# Patient Record
Sex: MALE | Race: WHITE
[De-identification: names, ages, dates, MRNs, and addresses within clinical notes are randomized per-mention and may not be internally consistent; named-entity substitution may affect disease eponyms.]

---

## 2020-03-12 ENCOUNTER — HOSPITAL ENCOUNTER (EMERGENCY)
Dept: HOSPITAL 96 - M.ERS | Age: 70
Discharge: HOME | End: 2020-03-12
Payer: COMMERCIAL

## 2020-03-12 VITALS — HEIGHT: 67 IN | BODY MASS INDEX: 26.4 KG/M2 | WEIGHT: 168.21 LBS

## 2020-03-12 VITALS — DIASTOLIC BLOOD PRESSURE: 77 MMHG | SYSTOLIC BLOOD PRESSURE: 132 MMHG
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
## 2020-03-12 DIAGNOSIS — S30.1XXA: ICD-10-CM
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
## 2020-03-12 DIAGNOSIS — F10.129: ICD-10-CM
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
## 2020-03-12 DIAGNOSIS — S01.81XA: ICD-10-CM

## 2020-03-12 DIAGNOSIS — S42.102A: Primary | ICD-10-CM
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
## 2020-03-12 LAB
ABSOLUTE BASOPHILS: 0.1 THOU/UL (ref 0–0.2)
ABSOLUTE EOSINOPHILS: 0.1 THOU/UL (ref 0–0.7)
ABSOLUTE MONOCYTES: 1.1 THOU/UL (ref 0–1.2)
ALBUMIN SERPL-MCNC: 3.3 G/DL (ref 3.4–5)
ALP SERPL-CCNC: 51 U/L (ref 46–116)
ALT SERPL-CCNC: 57 U/L (ref 30–65)
ANION GAP SERPL CALC-SCNC: 13 MMOL/L (ref 7–16)
APTT BLD: 24.2 SECONDS (ref 25–31.3)
AST SERPL-CCNC: 68 U/L (ref 15–37)
BASOPHILS NFR BLD AUTO: 0.4 %
BILIRUB SERPL-MCNC: 0.4 MG/DL
BUN SERPL-MCNC: 11 MG/DL (ref 7–18)
CALCIUM SERPL-MCNC: 7.3 MG/DL (ref 8.5–10.1)
CHLORIDE SERPL-SCNC: 108 MMOL/L (ref 98–107)
CO2 SERPL-SCNC: 24 MMOL/L (ref 21–32)
CREAT SERPL-MCNC: 0.8 MG/DL (ref 0.6–1.3)
EOSINOPHIL NFR BLD: 0.4 %
GLUCOSE SERPL-MCNC: 161 MG/DL (ref 70–99)
GRANULOCYTES NFR BLD MANUAL: 70.2 %
HCT VFR BLD CALC: 41.6 % (ref 42–52)
HGB BLD-MCNC: 14.1 GM/DL (ref 14–18)
INR PPP: 1.2
LYMPHOCYTES # BLD: 3 THOU/UL (ref 0.8–5.3)
LYMPHOCYTES NFR BLD AUTO: 21.3 %
MCH RBC QN AUTO: 34 PG (ref 26–34)
MCHC RBC AUTO-ENTMCNC: 34 G/DL (ref 28–37)
MCV RBC: 100 FL (ref 80–100)
MONOCYTES NFR BLD: 7.7 %
MPV: 8.3 FL. (ref 7.2–11.1)
NEUTROPHILS # BLD: 9.9 THOU/UL (ref 1.6–8.1)
NUCLEATED RBCS: 0 /100WBC
PLATELET COUNT*: 201 THOU/UL (ref 150–400)
POTASSIUM SERPL-SCNC: 3.9 MMOL/L (ref 3.5–5.1)
PROT SERPL-MCNC: 6.3 G/DL (ref 6.4–8.2)
PROTHROMBIN TIME: 12 SECONDS (ref 9.2–11.5)
RBC # BLD AUTO: 4.16 MIL/UL (ref 4.5–6)
RDW-CV: 14 % (ref 10.5–14.5)
SODIUM SERPL-SCNC: 145 MMOL/L (ref 136–145)
WBC # BLD AUTO: 14.1 THOU/UL (ref 4–11)

## 2020-03-12 NOTE — EKG
[place] [address]
Phone:  [phone]                     ELECTROCARDIOGRAM REPORT      
_______________________________________________________________________________
 
Name:         ALPHONSOLOREN              Room:                     [facility].#:    [id number]     Account #:     [id number]  
Admission:    20    Attend Phys:                     
Discharge:    20    Date of Birth: [birth year]  
Date of Service: 20 1321  Report #:      [id number]
        [id number]
_______________________________________________________________________________
THIS REPORT FOR:  //name//                      
 
                         [facility] ED
                                       
Test Date:    2020               Test Time:    13:21:37
Pat Name:     LOREN WERNER         Department:   
Patient ID:   [id number]            Room:          
Gender:                               Technician:   
:          [id number]               Requested By: Joaquin Kaye
Order Number: [id number] MD:   Raul Albarran
                                 Measurements
Intervals                              Axis          
Rate:         109                      P:            
AL:                                    QRS:          43
QRSD:         158                      T:            244
QT:           343                                    
QTc:          463                                    
                           Interpretive Statements
Atrial fibrillation
Premature ventricular contractions
Right bundle branch block
ST depr, consider ischemia, anterolateral lds
Compared to ECG 2017 13:10:45
Possible ischemia now present
Sinus rhythm no longer present
Ventricular-paced complex(es) or rhythm no longer present
Myocardial infarct finding no longer present
 
Electronically Signed On 3- 18:46:38 CDT by Raul Albarran
[email or web address]
 
 
 
 
 
 
 
 
 
 
 
 
 
 
  <ELECTRONICALLY SIGNED>
                                           By: Raul Albarran MD, FACC   
  20     1846
D: 20 1321   _____________________________________
T: 20 1321   Raul Albarran MD, FACC     /EPI

## 2021-03-03 ENCOUNTER — HOSPITAL ENCOUNTER (OUTPATIENT)
Dept: HOSPITAL 96 - M.RAD | Age: 71
End: 2021-03-03
Attending: INTERNAL MEDICINE
Payer: COMMERCIAL

## 2021-03-03 DIAGNOSIS — Z79.899: ICD-10-CM

## 2021-03-03 DIAGNOSIS — I42.9: Primary | ICD-10-CM

## 2021-03-03 LAB
ALBUMIN SERPL-MCNC: 3.7 G/DL (ref 3.4–5)
ALP SERPL-CCNC: 62 U/L (ref 46–116)
ALT SERPL-CCNC: 63 U/L (ref 30–65)
AST SERPL-CCNC: 74 U/L (ref 15–37)
BILIRUB DIRECT SERPL-MCNC: 0.2 MG/DL
BILIRUB SERPL-MCNC: 0.6 MG/DL
PROT SERPL-MCNC: 7.1 G/DL (ref 6.4–8.2)